# Patient Record
Sex: MALE | Race: BLACK OR AFRICAN AMERICAN
[De-identification: names, ages, dates, MRNs, and addresses within clinical notes are randomized per-mention and may not be internally consistent; named-entity substitution may affect disease eponyms.]

---

## 2022-07-06 ENCOUNTER — NURSE TRIAGE (OUTPATIENT)
Dept: OTHER | Facility: CLINIC | Age: 22
End: 2022-07-06

## 2022-07-06 NOTE — TELEPHONE ENCOUNTER
Received call from [facility] at [facility] with Red Flag Complaint. Current Symptoms: Pt's mother is calling. Pt reports groin pain. Pt works in a warehouse unloading trucks. Limited triage d/t the pt not being present during the time of the call. The pt is currently at work. Onset: 2-3 weeks ago    Associated Symptoms: None    Pain Severity: Pain has been constant. Mother believes that his pain has been moderate. Temperature: Denies fever    What has been tried: Advil    Pregnant: NA    Recommended disposition: See in Office Today    Pt is not yet an established pt. Care advice provided, patient verbalizes understanding; denies any other questions or concerns; instructed to call back for any new or worsening symptoms. Patient/Caller agrees with recommended disposition; writer provided warm transfer to [facility] at [facility] for appointment scheduling    Attention Provider: Thank you for allowing me to participate in the care of your patient. The patient was connected to triage in response to information provided to the ECC. Please do not respond through this encounter as the response is not directed to a shared pool.     Reason for Disposition   Patient wants to be seen    Protocols used: [facility]